# Patient Record
Sex: FEMALE | Race: WHITE | HISPANIC OR LATINO | Employment: FULL TIME | URBAN - METROPOLITAN AREA
[De-identification: names, ages, dates, MRNs, and addresses within clinical notes are randomized per-mention and may not be internally consistent; named-entity substitution may affect disease eponyms.]

---

## 2018-12-07 ENCOUNTER — OFFICE VISIT (OUTPATIENT)
Dept: URGENT CARE | Facility: CLINIC | Age: 28
End: 2018-12-07
Payer: MEDICAID

## 2018-12-07 VITALS
RESPIRATION RATE: 16 BRPM | BODY MASS INDEX: 19.49 KG/M2 | HEIGHT: 63 IN | TEMPERATURE: 99.2 F | HEART RATE: 78 BPM | OXYGEN SATURATION: 100 % | WEIGHT: 110 LBS | SYSTOLIC BLOOD PRESSURE: 110 MMHG | DIASTOLIC BLOOD PRESSURE: 68 MMHG

## 2018-12-07 DIAGNOSIS — J10.1 INFLUENZA A: ICD-10-CM

## 2018-12-07 LAB
FLUAV+FLUBV AG SPEC QL IA: NORMAL
INT CON NEG: NEGATIVE
INT CON NEG: NORMAL
INT CON POS: NORMAL
INT CON POS: POSITIVE
S PYO AG THROAT QL: NORMAL

## 2018-12-07 PROCEDURE — 87880 STREP A ASSAY W/OPTIC: CPT | Performed by: PHYSICIAN ASSISTANT

## 2018-12-07 PROCEDURE — 99204 OFFICE O/P NEW MOD 45 MIN: CPT | Performed by: PHYSICIAN ASSISTANT

## 2018-12-07 PROCEDURE — 87804 INFLUENZA ASSAY W/OPTIC: CPT | Performed by: PHYSICIAN ASSISTANT

## 2018-12-07 RX ORDER — ACETAMINOPHEN 120 MG/1
120 SUPPOSITORY RECTAL EVERY 4 HOURS PRN
COMMUNITY
End: 2018-12-10

## 2018-12-07 RX ORDER — AMOXICILLIN 125 MG/1
125 TABLET, CHEWABLE ORAL 3 TIMES DAILY
COMMUNITY
End: 2018-12-10

## 2018-12-07 RX ORDER — OSELTAMIVIR PHOSPHATE 75 MG/1
75 CAPSULE ORAL 2 TIMES DAILY
Qty: 10 CAP | Refills: 0 | Status: SHIPPED | OUTPATIENT
Start: 2018-12-07 | End: 2018-12-12

## 2018-12-07 RX ORDER — OMEGA-3 FATTY ACIDS/FISH OIL 300-1000MG
CAPSULE ORAL
COMMUNITY
End: 2018-12-10

## 2018-12-07 ASSESSMENT — ENCOUNTER SYMPTOMS
SENSORY CHANGE: 0
ABDOMINAL PAIN: 0
DIARRHEA: 0
PALPITATIONS: 0
NAUSEA: 0
FEVER: 1
SHORTNESS OF BREATH: 0
SINUS PAIN: 0
HEADACHES: 1
MYALGIAS: 1
TINGLING: 0
HEMOPTYSIS: 0
VOMITING: 0
WHEEZING: 0
COUGH: 1
RHINORRHEA: 1
SORE THROAT: 1
SWEATS: 0
SPUTUM PRODUCTION: 1
FOCAL WEAKNESS: 0
CHILLS: 1

## 2018-12-07 ASSESSMENT — COPD QUESTIONNAIRES: COPD: 0

## 2018-12-07 NOTE — PROGRESS NOTES
"Subjective:      Muriel Villaseñor is a 28 y.o. female who presents with Cough (x 2 days coughing mucus, sore throat, fever, weakness)            Cough   This is a new problem. Episode onset: 2 days. The problem has been unchanged. The cough is productive of sputum (yellow ). Associated symptoms include chills, a fever (subjective ), headaches, myalgias, nasal congestion, rhinorrhea and a sore throat. Pertinent negatives include no chest pain, ear congestion, ear pain, hemoptysis, postnasal drip, rash, shortness of breath, sweats or wheezing. Nothing aggravates the symptoms. She has tried nothing for the symptoms. There is no history of asthma, bronchitis, COPD or pneumonia.       No past medical history on file.    No past surgical history on file.    No family history on file.    No Known Allergies    Medications, Allergies, and current problem list reviewed today in Epic    Review of Systems   Constitutional: Positive for chills, fever (subjective ) and malaise/fatigue.   HENT: Positive for congestion, rhinorrhea and sore throat. Negative for ear discharge, ear pain, postnasal drip and sinus pain.    Respiratory: Positive for cough and sputum production. Negative for hemoptysis, shortness of breath and wheezing.    Cardiovascular: Negative for chest pain, palpitations and leg swelling.   Gastrointestinal: Negative for abdominal pain, diarrhea, nausea and vomiting.   Musculoskeletal: Positive for myalgias.   Skin: Negative for rash.   Neurological: Positive for headaches. Negative for tingling, sensory change and focal weakness.     All other systems reviewed and are negative.          Objective:     /68   Pulse 78   Temp 37.3 °C (99.2 °F) (Temporal)   Resp 16   Ht 1.6 m (5' 3\")   Wt 49.9 kg (110 lb)   SpO2 100%   BMI 19.49 kg/m²      Physical Exam   Constitutional: She is oriented to person, place, and time. She appears well-developed and well-nourished. No distress.   HENT:   Head: Normocephalic and " atraumatic.   Right Ear: Tympanic membrane, external ear and ear canal normal.   Left Ear: Tympanic membrane, external ear and ear canal normal.   Nose: Mucosal edema and rhinorrhea (clear) present.   Mouth/Throat: Uvula is midline and mucous membranes are normal. Posterior oropharyngeal erythema present. No tonsillar exudate.   Eyes: Conjunctivae are normal.   Neck: Neck supple.   Cardiovascular: Normal rate, regular rhythm and normal heart sounds.  Exam reveals no gallop and no friction rub.    No murmur heard.  Pulmonary/Chest: Effort normal and breath sounds normal. No respiratory distress. She has no decreased breath sounds. She has no wheezes. She has no rhonchi. She has no rales.   Lymphadenopathy:     She has cervical adenopathy (mild anterior cervical adenopathy. ).   Neurological: She is alert and oriented to person, place, and time. No cranial nerve deficit.   Skin: Skin is warm and dry. No rash noted.   Psychiatric: She has a normal mood and affect. Her behavior is normal. Judgment and thought content normal.               Assessment/Plan:     1. Influenza A  POCT Influenza A/B    POCT Rapid Strep A    oseltamivir (TAMIFLU) 75 MG Cap       - POCT Influenza A/B- Positive A  - POCT Rapid Strep A- negative.    •  oseltamivir (TAMIFLU) 75 MG Cap, Take 1 Cap by mouth 2 times a day for 5 days., Disp: 10 Cap, Rfl: 0    - encouraged fluids, rest, humidification, OTC decongestants and cough suppressants.   RTC if any worsening symptoms.   AVS printed and given to patient with home care instructions and indications for immediate follow-up.       Differential diagnoses, Supportive care, and indications for immediate follow-up discussed with patient.   Instructed to return to clinic or nearest emergency department for any change in condition, further concerns, or worsening of symptoms.    The patient demonstrated a good understanding and agreed with the treatment plan.    Saige Gonzales P.A.-C.

## 2018-12-07 NOTE — PATIENT INSTRUCTIONS
Gripe en los adultos  (Influenza, Adult)  La gripe es elliot infección viral que afecta principalmente las vías respiratorias. Las vías respiratorias incluyen órganos que ayudan a respirar, aranza los pulmones, la nariz y la garganta. La gripe provoca muchos síntomas del resfrío común, así aranza fiebre roxy y dolor corporal.  Se transmite fácilmente de persona a persona (es contagiosa). La mejor manera de prevenir la gripe es aplicándose la vacuna contra la gripe todos los años.  CAUSAS  La gripe es causada por un virus. Se puede contagiar el virus de las siguientes maneras:  · Al aspirar las gotitas que elliot persona infectada elimina al toser o estornudar.  · Al tocar algo que fue recientemente contaminado con el virus y luego llevarse la mano a la boca, la nariz o los ojos.  FACTORES DE RIESGO  Los siguientes factores pueden hacer que usted sea propenso a contraer la gripe:  · No lavarse las anthony frecuentemente con agua y jabón o un desinfectante de anthony a base de alcohol.  · Tener contacto cercano con muchas personas nakia la temporada de resfrío y gripe.  · Tocarse la boca, los ojos o la nariz sin lavarse ni desinfectarse las anthony jason.  · No beber suficientes líquidos o no seguir elliot dieta saludable.  · No dormir lo suficiente o no hacer suficiente actividad física.  · Tener un alto campos de estrés.  · No colocarse la vacuna anual contra la gripe.  Puede correr un mayor riesgo de complicaciones de la gripe, aranza elliot infección pulmonar grave (neumonía) si:  · Tiene más de 65 años.  · Está embarazada.  · Tiene un sistema que combate las defensas (sistema inmunitario) debilitado. Puede tener un sistema inmunitario debilitado si:  ¨ Tiene VIH o sida.  ¨ Está recibiendo quimioterapia.  ¨ Usa medicamentos que reducen la actividad (suprimen) del sistema inmunitario.  · Tiene elliot enfermedad a ila plazo (crónica), aranza cardiopatía coronaria, enfermedad renal, diabetes o enfermedad pulmonar.  · Tiene un trastorno  hepático.  · Son obesas.  · Tiene anemia.  SÍNTOMAS  Los síntomas de esta afección por lo general carey entre 4 y 10 días, y pueden tener los siguientes síntomas:  · Fiebre.  · Escalofríos.  · Dolor de valencia, ailin en el cuerpo o ailin musculares.  · Dolor de garganta.  · Tos.  · Secreción o congestión nasal.  · Molestias en el pecho y tos.  · Pérdida del apetito.  · Debilidad o cansancio (fatiga).  · Mareos.  · Náuseas o vómitos.  DIAGNÓSTICO  Esta afección se puede diagnosticar en función de los antecedentes médicos y un examen físico. El médico puede indicarle un cultivo faríngeo o nasal para confirmar el diagnóstico.  TRATAMIENTO  Si la gripe se detecta de manera temprana, puede recibir tratamiento con medicamentos antivirales que pueden reducir la duración de la enfermedad y la gravedad de los síntomas. [place] medicamento se puede administrar por vía oral o por vía intravenosa (IV), es decir, a través de un tubo que se coloca en elliot vena.  El objetivo del tratamiento es aliviar los síntomas cuidándose en sheppard casa. [place] puede incluir usar medicamentos de venta melonie, beber elliot gran cantidad de líquidos y agregar humedad al aire en sheppard casa.  En algunos casos, la gripe se evie sin tratamiento. Los casos graves o las complicaciones de gripe se pueden tratar en un hospital.  INSTRUCCIONES PARA EL CUIDADO EN EL HOGAR  · [place] los medicamentos de venta melonie y los recetados solamente aranza se lo haya indicado el médico.  · Use un humidificador de aire frío para agregar humedad al aire de sheppard casa. [place] puede ayudarlo a respirar mejor.  · Descanse todo lo que sea necesario.  · Griselda suficiente líquido para mantener la orina celine o de color amarillo pálido.  · Al toser o estornudar, cúbrase la boca y la nariz.  · Lávese las anthony con agua y jabón frecuentemente, en especial después de toser o estornudar. Use desinfectante para anthony si no dispone de agua y jabón.  · Quédese en sheppard casa y no concurra al trabajo o a la  escuela, aranza se lo haya indicado el médico. A menos que visite al médico, evite salir de sheppard casa hasta que no tenga fiebre nakia 24 horas sin el uso de medicamentos.  · Concurra a todas las visitas de control aranza se lo haya indicado el médico. [place] es importante.  PREVENCIÓN  · Aplicarse la vacuna anual contra la gripe es la mejor manera de evitar contagiarse la gripe. Puede aplicarse la vacuna contra la gripe a fines de verano, en otoño o en invierno. Pregúntele al médico cuándo debe aplicarse la vacuna contra la gripe.  · Lávese las anthony o use un desinfectante de anthony con frecuencia.  · Evite el contacto con personas que están enfermas nakia la temporada de resfrío y gripe.  · Siga elliot dieta saludable, asya muchos líquidos, duerma lo suficiente y realice actividad física con regularidad.  SOLICITE ATENCIÓN MÉDICA SI:  · Presenta nuevos síntomas.  · Tiene los siguientes síntomas:  ¨ Dolor en el pecho.  ¨ Diarrea.  ¨ Fiebre.  · La tos empeora.  · Produce más mucosidad.  · Siente náuseas o vomita.  SOLICITE ATENCIÓN MÉDICA DE INMEDIATO SI:  · Le falta el aire o tiene dificultad para respirar.  · La piel o las uñas se tornan de un color azulado.  · Presenta dolor intenso o rigidez de courtney.  · Le duele la valencia de forma repentina o tiene dolor en la linn o el oído.  · No puede dejar de vomitar.  Esta información no tiene aranza fin reemplazar el consejo del médico. Asegúrese de hacerle al médico cualquier pregunta que tenga.  Document Released: 09/27/2006 Document Revised: 04/10/2017 Document Reviewed: 10/11/2016  Elsevier Interactive Patient Education © 2017 [employer] Inc[employer]

## 2018-12-10 ENCOUNTER — OFFICE VISIT (OUTPATIENT)
Dept: URGENT CARE | Facility: CLINIC | Age: 28
End: 2018-12-10
Payer: MEDICAID

## 2018-12-10 VITALS
HEART RATE: 68 BPM | HEIGHT: 63 IN | SYSTOLIC BLOOD PRESSURE: 104 MMHG | TEMPERATURE: 98 F | WEIGHT: 110 LBS | DIASTOLIC BLOOD PRESSURE: 76 MMHG | RESPIRATION RATE: 12 BRPM | BODY MASS INDEX: 19.49 KG/M2 | OXYGEN SATURATION: 95 %

## 2018-12-10 DIAGNOSIS — R11.0 NAUSEA: ICD-10-CM

## 2018-12-10 DIAGNOSIS — J11.1 FLU: ICD-10-CM

## 2018-12-10 LAB
APPEARANCE UR: CLEAR
BILIRUB UR STRIP-MCNC: NORMAL MG/DL
COLOR UR AUTO: NORMAL
GLUCOSE UR STRIP.AUTO-MCNC: NORMAL MG/DL
INT CON NEG: NEGATIVE
INT CON POS: POSITIVE
KETONES UR STRIP.AUTO-MCNC: NORMAL MG/DL
LEUKOCYTE ESTERASE UR QL STRIP.AUTO: NORMAL
NITRITE UR QL STRIP.AUTO: NORMAL
PH UR STRIP.AUTO: 6 [PH] (ref 5–8)
POC URINE PREGNANCY TEST: NEGATIVE
PROT UR QL STRIP: NORMAL MG/DL
RBC UR QL AUTO: NORMAL
SP GR UR STRIP.AUTO: 1.02
UROBILINOGEN UR STRIP-MCNC: 0.2 MG/DL

## 2018-12-10 PROCEDURE — 81002 URINALYSIS NONAUTO W/O SCOPE: CPT | Performed by: FAMILY MEDICINE

## 2018-12-10 PROCEDURE — 81025 URINE PREGNANCY TEST: CPT | Performed by: FAMILY MEDICINE

## 2018-12-10 PROCEDURE — 99214 OFFICE O/P EST MOD 30 MIN: CPT | Performed by: FAMILY MEDICINE

## 2018-12-10 RX ORDER — PROMETHAZINE HYDROCHLORIDE, PHENYLEPHRINE HYDROCHLORIDE AND CODEINE PHOSPHATE 6.25; 5; 1 MG/5ML; MG/5ML; MG/5ML
5 SOLUTION ORAL EVERY 6 HOURS PRN
Qty: 200 ML | Refills: 0 | Status: SHIPPED | OUTPATIENT
Start: 2018-12-10 | End: 2018-12-20

## 2018-12-10 ASSESSMENT — ENCOUNTER SYMPTOMS
DIZZINESS: 0
MYALGIAS: 1
COUGH: 1
FEVER: 1
HEMOPTYSIS: 0
CHILLS: 0
ORTHOPNEA: 0
FOCAL WEAKNESS: 0
SHORTNESS OF BREATH: 0

## 2018-12-10 NOTE — PROGRESS NOTES
"Subjective:      Muriel Villaseñor is a 28 y.o. female who presents with Medication Reaction (Feels nausea when taking tamiflu and also feels weak )    - This is a very pleasant, well and non-toxic appearing 28 y.o. female with complaints of 5 days ago w/ cough, runny nose, aches/malaise/fevers. Came to clinic about 3 days ago and diagnosed w/ Flu and started on tamiflu. Feels improved but coughing a lot still and tamiflu makes her nauseated at times.           ALLERGIES:  Patient has no known allergies.     PMH:  History reviewed. No pertinent past medical history.     MEDS:    Current Outpatient Prescriptions:   •  Promethazine-Phenyleph-Codeine (PHENERGAN VC CODEINE) 6.25-5-10 MG/5ML Syrup, Take 5 mL by mouth every 6 hours as needed for up to 10 days., Disp: 200 mL, Rfl: 0  •  oseltamivir (TAMIFLU) 75 MG Cap, Take 1 Cap by mouth 2 times a day for 5 days., Disp: 10 Cap, Rfl: 0    ** I have documented what I find to be significant in regards to past medical, social, family and surgical history  in my HPI or under PMH/PSH/FH review section, otherwise it is contributory **             HPI    Review of Systems   Constitutional: Positive for fever and malaise/fatigue. Negative for chills.   HENT: Positive for congestion.    Respiratory: Positive for cough. Negative for hemoptysis and shortness of breath.    Cardiovascular: Negative for chest pain and orthopnea.   Musculoskeletal: Positive for myalgias.   Neurological: Negative for dizziness and focal weakness.          Objective:     /76   Pulse 68   Temp 36.7 °C (98 °F)   Resp 12   Ht 1.6 m (5' 3\")   Wt 49.9 kg (110 lb)   SpO2 95%   BMI 19.49 kg/m²      Physical Exam   Constitutional: She appears well-developed. No distress.   HENT:   Head: Normocephalic and atraumatic.   Mouth/Throat: Oropharynx is clear and moist.   Eyes: Conjunctivae are normal.   Neck: Neck supple.   Cardiovascular: Regular rhythm.    No murmur heard.  Pulmonary/Chest: Effort normal and " breath sounds normal. No respiratory distress.   Neurological: She is alert. She exhibits normal muscle tone.   Skin: Skin is warm and dry.   Psychiatric: She has a normal mood and affect. Judgment normal.   Nursing note and vitals reviewed.              Assessment/Plan:         1. Nausea  POCT Pregnancy    POCT Urinalysis   2. Flu  Promethazine-Phenyleph-Codeine (PHENERGAN VC CODEINE) 6.25-5-10 MG/5ML Syrup             Dx & d/c instructions discussed w/ patient and/or family members.     ER precautions (worsening signs symptoms and when to go to ER) discussed.    Follow up w/ PCP in 2-3 days to make sure symptoms improving and no further intervention/treatment and/or work-up needed was advised, ER if feeling worse or not improving in 2 days.    Possible side effects (i.e. Rash, GI upset/constipation, sedation, elevation of BP or sugars) of any medications given discussed.     Patient left in stable condition

## 2018-12-10 NOTE — LETTER
December 10, 2018         Patient: Muriel Villaseñor   YOB: 1990   Date of Visit: 12/10/2018           To Whom it May Concern:    Muriel Villaseñor was seen in my clinic on 12/10/2018. She may return to work in 2-3 days.    If you have any questions or concerns, please don't hesitate to call.        Sincerely,           Pasquale Hogue M.D.  Electronically Signed

## 2020-01-04 ENCOUNTER — OFFICE VISIT (OUTPATIENT)
Dept: URGENT CARE | Facility: CLINIC | Age: 30
End: 2020-01-04

## 2020-01-04 VITALS
HEART RATE: 75 BPM | WEIGHT: 111.8 LBS | DIASTOLIC BLOOD PRESSURE: 76 MMHG | SYSTOLIC BLOOD PRESSURE: 120 MMHG | TEMPERATURE: 98.1 F | HEIGHT: 65 IN | OXYGEN SATURATION: 99 % | RESPIRATION RATE: 14 BRPM | BODY MASS INDEX: 18.63 KG/M2

## 2020-01-04 DIAGNOSIS — N30.01 ACUTE CYSTITIS WITH HEMATURIA: ICD-10-CM

## 2020-01-04 LAB
APPEARANCE UR: NORMAL
BILIRUB UR STRIP-MCNC: NEGATIVE MG/DL
COLOR UR AUTO: YELLOW
GLUCOSE UR STRIP.AUTO-MCNC: NEGATIVE MG/DL
KETONES UR STRIP.AUTO-MCNC: 80 MG/DL
LEUKOCYTE ESTERASE UR QL STRIP.AUTO: NORMAL
NITRITE UR QL STRIP.AUTO: POSITIVE
PH UR STRIP.AUTO: 5.5 [PH] (ref 5–8)
PROT UR QL STRIP: NORMAL MG/DL
RBC UR QL AUTO: NORMAL
SP GR UR STRIP.AUTO: 1.02
UROBILINOGEN UR STRIP-MCNC: 0.2 MG/DL

## 2020-01-04 PROCEDURE — 99214 OFFICE O/P EST MOD 30 MIN: CPT | Performed by: FAMILY MEDICINE

## 2020-01-04 PROCEDURE — 81002 URINALYSIS NONAUTO W/O SCOPE: CPT | Performed by: FAMILY MEDICINE

## 2020-01-04 RX ORDER — NITROFURANTOIN 25; 75 MG/1; MG/1
100 CAPSULE ORAL EVERY 12 HOURS
Qty: 10 CAP | Refills: 0 | Status: SHIPPED | OUTPATIENT
Start: 2020-01-04 | End: 2020-01-09

## 2020-01-04 ASSESSMENT — ENCOUNTER SYMPTOMS
WEIGHT LOSS: 0
EYE DISCHARGE: 0
NAUSEA: 0
VOMITING: 0
MYALGIAS: 0
EYE REDNESS: 0

## 2020-01-04 NOTE — PROGRESS NOTES
"Subjective:      Muriel Villaseñor is a 29 y.o. female who presents with UTI (x 1 day, pain in lower left back, burning sensation when urinating)            Per boyfriend translating. 5d urinary burning, urgency, frequency. Initial hematuria resolved. LBP. No flank pain. No fever. No PMH pyelonephritis. Short term relief with Azo. LMP 1wk ago. Denies possible pregnancy. No other aggravating or alleviating factors.        Review of Systems   Constitutional: Negative for malaise/fatigue and weight loss.   Eyes: Negative for discharge and redness.   Gastrointestinal: Negative for nausea and vomiting.   Musculoskeletal: Negative for joint pain and myalgias.   Skin: Negative for itching and rash.     .  Medications, Allergies, and current problem list reviewed today in Epic       Objective:     /76 (Patient Position: Sitting)   Pulse 75   Temp 36.7 °C (98.1 °F) (Temporal)   Resp 14   Ht 1.651 m (5' 5\")   Wt 50.7 kg (111 lb 12.8 oz)   SpO2 99%   BMI 18.60 kg/m²      Physical Exam  Constitutional:       General: She is not in acute distress.     Appearance: She is well-developed.   HENT:      Head: Normocephalic and atraumatic.   Eyes:      Conjunctiva/sclera: Conjunctivae normal.   Cardiovascular:      Rate and Rhythm: Normal rate and regular rhythm.      Heart sounds: Normal heart sounds. No murmur.   Pulmonary:      Effort: Pulmonary effort is normal.      Breath sounds: Normal breath sounds. No wheezing.   Genitourinary:     Comments: No CVAT. +suprapubic tenderness  Skin:     General: Skin is warm and dry.      Findings: No rash.   Neurological:      Mental Status: She is alert and oriented to person, place, and time.                 Assessment/Plan:     UA reviewed    1. Acute cystitis with hematuria  POCT Urinalysis    nitrofurantoin monohyd macro (MACROBID) 100 MG Cap     Differential diagnosis, natural history, supportive care, and indications for immediate follow-up discussed at length.     Push fluids    "

## 2022-03-19 ENCOUNTER — OFFICE VISIT (OUTPATIENT)
Dept: URGENT CARE | Facility: CLINIC | Age: 32
End: 2022-03-19
Payer: COMMERCIAL

## 2022-03-19 ENCOUNTER — HOSPITAL ENCOUNTER (OUTPATIENT)
Facility: MEDICAL CENTER | Age: 32
End: 2022-03-19
Attending: PHYSICIAN ASSISTANT
Payer: COMMERCIAL

## 2022-03-19 VITALS
TEMPERATURE: 98.6 F | RESPIRATION RATE: 20 BRPM | HEIGHT: 62 IN | BODY MASS INDEX: 22.82 KG/M2 | OXYGEN SATURATION: 97 % | WEIGHT: 124 LBS | SYSTOLIC BLOOD PRESSURE: 120 MMHG | DIASTOLIC BLOOD PRESSURE: 68 MMHG | HEART RATE: 87 BPM

## 2022-03-19 DIAGNOSIS — Z3A.28 28 WEEKS GESTATION OF PREGNANCY: ICD-10-CM

## 2022-03-19 DIAGNOSIS — B34.9 NONSPECIFIC SYNDROME SUGGESTIVE OF VIRAL ILLNESS: ICD-10-CM

## 2022-03-19 LAB
INT CON NEG: NORMAL
INT CON POS: NORMAL
S PYO AG THROAT QL: NEGATIVE

## 2022-03-19 PROCEDURE — 87880 STREP A ASSAY W/OPTIC: CPT | Performed by: PHYSICIAN ASSISTANT

## 2022-03-19 PROCEDURE — U0003 INFECTIOUS AGENT DETECTION BY NUCLEIC ACID (DNA OR RNA); SEVERE ACUTE RESPIRATORY SYNDROME CORONAVIRUS 2 (SARS-COV-2) (CORONAVIRUS DISEASE [COVID-19]), AMPLIFIED PROBE TECHNIQUE, MAKING USE OF HIGH THROUGHPUT TECHNOLOGIES AS DESCRIBED BY CMS-2020-01-R: HCPCS

## 2022-03-19 PROCEDURE — 99214 OFFICE O/P EST MOD 30 MIN: CPT | Performed by: PHYSICIAN ASSISTANT

## 2022-03-19 PROCEDURE — U0005 INFEC AGEN DETEC AMPLI PROBE: HCPCS

## 2022-03-19 ASSESSMENT — ENCOUNTER SYMPTOMS
SHORTNESS OF BREATH: 0
ABDOMINAL PAIN: 0
MYALGIAS: 0
NAUSEA: 0
DIARRHEA: 0
EYE PAIN: 0
SORE THROAT: 1
FEVER: 0
CHILLS: 0
VOMITING: 0
CONSTIPATION: 0
COUGH: 0
HEADACHES: 1

## 2022-03-19 NOTE — LETTER
March 19, 2022         Patient: Muriel Villaseñor   YOB: 1990   Date of Visit: 3/19/2022           To Whom it May Concern:    Muriel Villaseñor was seen in my clinic on 3/19/2022.    If you have any questions or concerns, please don't hesitate to call.        Sincerely,           Frank Dan P.A.-C.  Electronically Signed

## 2022-03-20 DIAGNOSIS — B34.9 NONSPECIFIC SYNDROME SUGGESTIVE OF VIRAL ILLNESS: ICD-10-CM

## 2022-03-20 LAB
COVID ORDER STATUS COVID19: NORMAL
SARS-COV-2 RNA RESP QL NAA+PROBE: NOTDETECTED
SPECIMEN SOURCE: NORMAL

## 2022-03-20 NOTE — PROGRESS NOTES
"Subjective:   Muriel Villaseñor is a 31 y.o. female who presents for Pharyngitis (Sore throat, HA, nasal sita, SOB, not sleeping x 3 days)      31-year-old female presents with a 3-day history of mild sore throat, headache, nasal congestion as well as exercise fatigue.  She had difficulty sleeping secondary to nasal congestion.  She is around 5 months pregnant, talked to her OB/GYN who recommended use of Tylenol and Claritin.  No known sick contacts, no recent travel.  Currently no difficulty breathing.  No fevers or chills or body aches      Review of Systems   Constitutional: Positive for malaise/fatigue. Negative for chills and fever.   HENT: Positive for congestion and sore throat. Negative for ear pain.    Eyes: Negative for pain.   Respiratory: Negative for cough and shortness of breath.    Cardiovascular: Negative for chest pain.   Gastrointestinal: Negative for abdominal pain, constipation, diarrhea, nausea and vomiting.   Genitourinary: Negative for dysuria.   Musculoskeletal: Negative for myalgias.   Skin: Negative for rash.   Neurological: Positive for headaches.       Medications, Allergies, and current problem list reviewed today in Epic.     Objective:     /68 (BP Location: Left arm, Patient Position: Sitting, BP Cuff Size: Adult long)   Pulse 87   Temp 37 °C (98.6 °F) (Temporal)   Resp 20   Ht 1.58 m (5' 2.21\")   Wt 56.2 kg (124 lb)   SpO2 97%     Physical Exam  Vitals reviewed.   Constitutional:       Appearance: Normal appearance.   HENT:      Head: Normocephalic and atraumatic.      Right Ear: Tympanic membrane, ear canal and external ear normal.      Left Ear: Tympanic membrane, ear canal and external ear normal.      Nose: Congestion and rhinorrhea present.      Mouth/Throat:      Mouth: Mucous membranes are moist.      Pharynx: No oropharyngeal exudate or posterior oropharyngeal erythema.      Comments: POST NASAL DRIP  Eyes:      Conjunctiva/sclera: Conjunctivae normal.   Cardiovascular: "      Rate and Rhythm: Normal rate and regular rhythm.   Pulmonary:      Effort: Pulmonary effort is normal.      Breath sounds: Normal breath sounds.   Musculoskeletal:      Cervical back: Normal range of motion.   Lymphadenopathy:      Cervical: No cervical adenopathy.   Skin:     General: Skin is warm and dry.      Capillary Refill: Capillary refill takes less than 2 seconds.   Neurological:      Mental Status: She is alert and oriented to person, place, and time.         Assessment/Plan:     Diagnosis and associated orders:     1. Nonspecific syndrome suggestive of viral illness  POCT Rapid Strep A    COVID/SARS CoV-2 PCR   2. 28 weeks gestation of pregnancy        Comments/MDM:     • Strep negative  • Short duration of symptoms without any severe symptoms or signs secondary bacterial consolidation.  Recommend Covid testing and strict ER precautions.  Patient to get the results via hi5hart.  She is not vaccinated, pregnant and high risk.  I discussed with her the severity of her situation if she is Covid positive and to closely monitor symptoms and have low threshold to present to the ER if she has any worsening dyspnea or chest pain.  Currently she is well-appearing without any respiratory distress, well-appearing vital signs and normal auscultatory exam         Differential diagnosis, natural history, supportive care, and indications for immediate follow-up discussed.    Advised the patient to follow-up with the primary care physician for recheck, reevaluation, and consideration of further management.    Please note that this dictation was created using voice recognition software. I have made a reasonable attempt to correct obvious errors, but I expect that there are errors of grammar and possibly content that I did not discover before finalizing the note.    This note was electronically signed by Frank Dan PA-C

## 2022-05-28 ENCOUNTER — HOSPITAL ENCOUNTER (EMERGENCY)
Facility: MEDICAL CENTER | Age: 32
End: 2022-05-28
Payer: COMMERCIAL

## 2022-05-28 ENCOUNTER — HOSPITAL ENCOUNTER (EMERGENCY)
Facility: MEDICAL CENTER | Age: 32
End: 2022-05-28
Attending: OBSTETRICS & GYNECOLOGY | Admitting: OBSTETRICS & GYNECOLOGY
Payer: COMMERCIAL

## 2022-05-28 VITALS
TEMPERATURE: 97.5 F | SYSTOLIC BLOOD PRESSURE: 110 MMHG | HEIGHT: 62 IN | HEART RATE: 72 BPM | DIASTOLIC BLOOD PRESSURE: 70 MMHG | WEIGHT: 135 LBS | OXYGEN SATURATION: 94 % | BODY MASS INDEX: 24.84 KG/M2

## 2022-05-28 LAB
ALBUMIN SERPL BCP-MCNC: 3.3 G/DL (ref 3.2–4.9)
ALBUMIN/GLOB SERPL: 1.1 G/DL
ALP SERPL-CCNC: 145 U/L (ref 30–99)
ALT SERPL-CCNC: 14 U/L (ref 2–50)
ANION GAP SERPL CALC-SCNC: 13 MMOL/L (ref 7–16)
AST SERPL-CCNC: 22 U/L (ref 12–45)
BILIRUB SERPL-MCNC: 0.2 MG/DL (ref 0.1–1.5)
BUN SERPL-MCNC: 7 MG/DL (ref 8–22)
CALCIUM SERPL-MCNC: 8.7 MG/DL (ref 8.5–10.5)
CHLORIDE SERPL-SCNC: 104 MMOL/L (ref 96–112)
CO2 SERPL-SCNC: 19 MMOL/L (ref 20–33)
CREAT SERPL-MCNC: 0.35 MG/DL (ref 0.5–1.4)
EKG IMPRESSION: NORMAL
GFR SERPLBLD CREATININE-BSD FMLA CKD-EPI: 139 ML/MIN/1.73 M 2
GLOBULIN SER CALC-MCNC: 3.1 G/DL (ref 1.9–3.5)
GLUCOSE SERPL-MCNC: 106 MG/DL (ref 65–99)
POTASSIUM SERPL-SCNC: 3.4 MMOL/L (ref 3.6–5.5)
PROT SERPL-MCNC: 6.4 G/DL (ref 6–8.2)
SODIUM SERPL-SCNC: 136 MMOL/L (ref 135–145)
TSH SERPL DL<=0.005 MIU/L-ACNC: 2.22 UIU/ML (ref 0.38–5.33)

## 2022-05-28 PROCEDURE — 80053 COMPREHEN METABOLIC PANEL: CPT

## 2022-05-28 PROCEDURE — 93010 ELECTROCARDIOGRAM REPORT: CPT | Performed by: INTERNAL MEDICINE

## 2022-05-28 PROCEDURE — 59025 FETAL NON-STRESS TEST: CPT

## 2022-05-28 PROCEDURE — 84443 ASSAY THYROID STIM HORMONE: CPT

## 2022-05-28 PROCEDURE — 700102 HCHG RX REV CODE 250 W/ 637 OVERRIDE(OP): Performed by: OBSTETRICS & GYNECOLOGY

## 2022-05-28 PROCEDURE — 99284 EMERGENCY DEPT VISIT MOD MDM: CPT

## 2022-05-28 PROCEDURE — 36415 COLL VENOUS BLD VENIPUNCTURE: CPT

## 2022-05-28 PROCEDURE — 93005 ELECTROCARDIOGRAM TRACING: CPT | Performed by: OBSTETRICS & GYNECOLOGY

## 2022-05-28 PROCEDURE — A9270 NON-COVERED ITEM OR SERVICE: HCPCS | Performed by: OBSTETRICS & GYNECOLOGY

## 2022-05-28 RX ORDER — POTASSIUM CHLORIDE 20 MEQ/1
20 TABLET, EXTENDED RELEASE ORAL DAILY
Status: DISCONTINUED | OUTPATIENT
Start: 2022-05-28 | End: 2022-05-28 | Stop reason: HOSPADM

## 2022-05-28 RX ADMIN — POTASSIUM CHLORIDE 20 MEQ: 20 TABLET, EXTENDED RELEASE ORAL at 14:43

## 2022-05-28 ASSESSMENT — PAIN SCALES - GENERAL: PAINLEVEL: 8

## 2022-05-28 NOTE — PROGRESS NOTES
Report received from Janna LITTLEJOHN.     1430-Dr. Francisco at bedside to see pt. Patient has not felt any pressure in her chest since being here and feels normal FM now. Pt may d/c home after receiving PO potassium. See MAR. D/c instructions reviewed with pt. Pt d/c'd in stable condition.

## 2022-05-28 NOTE — PROGRESS NOTES
LAKISHA: 2022 31.5    1016: Pt presents to L&D with c/o decreased fetal movement and pressure in chest. Pt states that she has not felt the baby move since 7 pm last night and has been feeling pressure in chest intermittently over the last two days. Denies LOF, VB, or UCs. External monitors applied. Reactive NST obtained.     1135: Dr. Francisco at bedside. Pt assessed by provider. Strip reviewed by MD. MD ordering EKG, CMP, TSH and GI cocktail.     1315: Report given to Doretha BOOKER RN. POC discussed.

## 2022-05-28 NOTE — ED PROVIDER NOTES
DATE OF SERVICE:  2022     HISTORY OF PRESENT ILLNESS:  The patient is a 31-year-old female  1,   para 0 who is 31 and 5/7th weeks' gestation, presents to labor and delivery   for several complaints.  The patient reports that since last night, she had   decreased fetal movement.  She reports it is better now that she is here in   labor and delivery, but this morning, she had not felt the baby move very   well.  She denies any leaking fluid or vaginal bleeding.  She also notes that   she is getting occasional episodes of some chest pressure and when she has the   pressure, it increases to her neck and her face and sometimes will cause a   headache and it will last a few seconds and go away.  She says they are   episodic and at some times when it is happening, she will have occasional pain   with it.  She does not necessarily describe chest pain, but just pressure.    She has no medical problems and she has had no problems this pregnancy thus   far.  When at the bedside with her, she has no nausea, vomiting, no diarrhea.    Normal bowel movements.  She is afebrile.  At the current time of seeing her,   she does not feel any pain.  Also, she denies any shortness of breath.  She   does not feel contractions and she denies any acid reflux.     PAST MEDICAL HISTORY:  She has no medical problems.     PAST SURGICAL HISTORY:  Negative.     OBSTETRICAL HISTORY:  .     ALLERGIES:  None.     CURRENT MEDICATIONS:  Include only prenatal vitamins.     SOCIAL HISTORY:  No alcohol, tobacco or history of drug use.     GYNECOLOGIC HISTORY:  Noncontributory.     PHYSICAL EXAMINATION:  VITAL SIGNS:  Stable.  Blood pressure is 110/70, her pulse is in the 70s-80s   and she is satting 96% on room air and is afebrile.  Weight 135 pounds.  GENERAL:  She is awake, alert, oriented.  She is in no acute distress.  CARDIOVASCULAR:  Regular rate and rhythm.  CHEST:  Clear to auscultation bilaterally.  ABDOMEN:  Gravid, nontender,  nondistended, normal bowel sounds.  EXTREMITIES:  No cyanosis, clubbing or edema.  PELVIC:  Sterile vaginal exam was deferred.  Fetal heart rate tracing category   1, reactive, moderate variability.  No decelerations.  She had some rare   contractions seen over the course of the time she was in labor and delivery,   but is not feeling them and her pressure is not associated with her   contractions.     DIAGNOSTIC DATA:  She had an EKG which was normal sinus rhythm.     LABORATORY DATA:  She had a chemistry panel, which did reveal a slightly low   potassium of 3.4, creatinine 0.35.  Liver function was normal.  Her TSH was   2.2.     ASSESSMENT AND PLAN:  A 31-year-old female,  at 31 and 5/7th weeks'   gestation.  1.  Decreased fetal movement, resolved.  The patient is feeling the baby move   well now, category 1 fetal tracing.  Continue kick counts b.i.d.  2.  Episodic pressure feeling up in her chest and up towards her face, was not   present at the time of me seeing her.  Pushing on her breast bone did not   reproduce any symptoms.  Her EKG was normal sinus rhythm.  Her labs were   essentially normal, although her potassium was slightly low.  She did receive   some potassium supplement prior to being discharged home.  Advised to eat a   regular diet, but we will plan on sending her home.  I also advised that she   may be having silent acid reflux that may be causing this or she may be having   these symptoms when she does contract even though she is not feeling   contraction pain.  I advised her to try to be aware of that and see if her   abdomen is hard at the time that she has these episodes.  She may take Pepcid   to see if possibility of acid reflux makes it go away.  Otherwise, she will   follow up with Dr. Sloan later this week, sooner if needed and continue kick   counts and  labor warnings.        ______________________________  MD EMILIE KAUR/BARBER    DD:  2022 16:35  DT:   05/28/2022 16:52    Job#:  [id number]

## 2022-05-28 NOTE — DISCHARGE INSTRUCTIONS
General Instructions:  If you think you are in labor, time contractions (lying on your left side) from the beginning of one contraction to the beginning of the next contraction for at least one hour.  Increase fluid intake: you should consume 10-12 8 oz glasses of non-caffeinated fluid per day.  Report any pressure or burning on urination to your physician.  Monitor fetal movement: If you notice an absence or decrease in fetal movement, drink a large glass of water and rest on your side.  If there is no increase in movement, call your physician or go to the hospital for further evaluation.  Report any sudden, sharp abdominal pain.  Report any bleeding.  Spotting or pinkish discharge is normal after vaginal exam.  You may also spot after sexual intercourse.    Pre-term Labor (<37 weeks):  Call your physician or return to the hospital if:  You have painless regular contractions more than 4 in one hour.  Your water breaks (remember time and color).  You have menstrual-like cramps, a low dull backache or pressure in your pelvis or back.  Your baby does not move enough to complete the daily kick count (10 movements in 2 hours).  Your baby moves much less often than on the days before or you have not felt your baby move all day.  Please review the MEDICATION LIST section of your AFTER VISIT SUMMARY document.  Take your medication as prescribed    Other Instructions:  Please carefully review your entire AFTER VISIT SUMMARY document for all discharge instructions.

## 2022-07-04 ENCOUNTER — OFFICE VISIT (OUTPATIENT)
Dept: URGENT CARE | Facility: CLINIC | Age: 32
End: 2022-07-04
Payer: COMMERCIAL

## 2022-07-04 ENCOUNTER — HOSPITAL ENCOUNTER (OUTPATIENT)
Facility: MEDICAL CENTER | Age: 32
End: 2022-07-04
Payer: COMMERCIAL

## 2022-07-04 VITALS
HEART RATE: 87 BPM | SYSTOLIC BLOOD PRESSURE: 100 MMHG | DIASTOLIC BLOOD PRESSURE: 60 MMHG | TEMPERATURE: 98.7 F | RESPIRATION RATE: 16 BRPM | BODY MASS INDEX: 23.66 KG/M2 | HEIGHT: 65 IN | WEIGHT: 142 LBS | OXYGEN SATURATION: 98 %

## 2022-07-04 DIAGNOSIS — R30.0 DYSURIA: ICD-10-CM

## 2022-07-04 DIAGNOSIS — N30.01 ACUTE CYSTITIS WITH HEMATURIA: ICD-10-CM

## 2022-07-04 DIAGNOSIS — R39.15 URINARY URGENCY: ICD-10-CM

## 2022-07-04 LAB
APPEARANCE UR: CLEAR
BILIRUB UR STRIP-MCNC: NEGATIVE MG/DL
COLOR UR AUTO: YELLOW
GLUCOSE UR STRIP.AUTO-MCNC: NEGATIVE MG/DL
KETONES UR STRIP.AUTO-MCNC: NEGATIVE MG/DL
LEUKOCYTE ESTERASE UR QL STRIP.AUTO: NORMAL
NITRITE UR QL STRIP.AUTO: NEGATIVE
PH UR STRIP.AUTO: 6 [PH] (ref 5–8)
PROT UR QL STRIP: NEGATIVE MG/DL
RBC UR QL AUTO: NORMAL
SP GR UR STRIP.AUTO: 1.02
UROBILINOGEN UR STRIP-MCNC: 1 MG/DL

## 2022-07-04 PROCEDURE — 81002 URINALYSIS NONAUTO W/O SCOPE: CPT

## 2022-07-04 PROCEDURE — 87186 SC STD MICRODIL/AGAR DIL: CPT | Mod: 91

## 2022-07-04 PROCEDURE — 99214 OFFICE O/P EST MOD 30 MIN: CPT | Mod: 25

## 2022-07-04 PROCEDURE — 87077 CULTURE AEROBIC IDENTIFY: CPT

## 2022-07-04 PROCEDURE — 87086 URINE CULTURE/COLONY COUNT: CPT

## 2022-07-04 RX ORDER — CEPHALEXIN 500 MG/1
500 CAPSULE ORAL 2 TIMES DAILY
Qty: 14 CAPSULE | Refills: 0 | Status: SHIPPED | OUTPATIENT
Start: 2022-07-04 | End: 2022-07-11

## 2022-07-04 ASSESSMENT — ENCOUNTER SYMPTOMS
CARDIOVASCULAR NEGATIVE: 1
MUSCULOSKELETAL NEGATIVE: 1
FLANK PAIN: 0
ABDOMINAL PAIN: 0
CHILLS: 0
RESPIRATORY NEGATIVE: 1
NAUSEA: 0
FEVER: 0
VOMITING: 0

## 2022-07-04 NOTE — PROGRESS NOTES
"Trini Villaseñor is a 32 y.o. female who presents with Dysuria            HPI     Patient presents with symptoms that started 5 days prior.  She endorses dysuria and urinary urgency.  She denies any urinary frequency, hematuria, flank pain.  She further denies any fever, chills, nausea, vomiting.  Patient reports some abdominal discomfort accompanying her symptoms.  She denies any vaginal bleeding bleeding or severe abdominal pain.  Patient is on her trip third trimester, reports that she spoke with her OB GYN and was instructed to come to urgent care for further evaluation and management.      Patient's current problem list, medications, and past medical/surgical history were reviewed in Epic.    PMH:  has no past medical history on file.  MEDS: No current outpatient medications on file.  ALLERGIES: No Known Allergies  SURGHX: History reviewed. No pertinent surgical history.  SOCHX:  reports that she has never smoked. She has never used smokeless tobacco. She reports that she does not drink alcohol and does not use drugs.  FH: Reviewed with patient, not pertinent to this visit.       Review of Systems   Constitutional: Negative for chills and fever.   HENT: Negative.    Respiratory: Negative.    Cardiovascular: Negative.    Gastrointestinal: Negative for abdominal pain, nausea and vomiting.   Genitourinary: Positive for dysuria and urgency. Negative for flank pain, frequency and hematuria.   Musculoskeletal: Negative.               Objective     /60 (BP Location: Right arm, Patient Position: Sitting, BP Cuff Size: Adult long)   Pulse 87   Temp 37.1 °C (98.7 °F) (Temporal)   Resp 16   Ht 1.651 m (5' 5\")   Wt 64.4 kg (142 lb)   SpO2 98%   BMI 23.63 kg/m²      Physical Exam  Constitutional:       Appearance: Normal appearance.   HENT:      Head: Normocephalic.      Nose: Nose normal.   Eyes:      Extraocular Movements: Extraocular movements intact.   Cardiovascular:      Rate and Rhythm: Normal " rate and regular rhythm.      Pulses: Normal pulses.      Heart sounds: Normal heart sounds.   Pulmonary:      Effort: Pulmonary effort is normal.      Breath sounds: Normal breath sounds.   Abdominal:      Tenderness: There is no abdominal tenderness. There is no right CVA tenderness or left CVA tenderness.   Musculoskeletal:         General: Normal range of motion.      Cervical back: Normal range of motion.   Skin:     General: Skin is warm and dry.   Neurological:      General: No focal deficit present.      Mental Status: She is alert.   Psychiatric:         Mood and Affect: Mood normal.         Behavior: Behavior normal.         Judgment: Judgment normal.     Urinalysis results:    Leukocyte-small; nitrite-negative; 5-trace; glucose, bilirubin, ketone, protein-negative             Assessment & Plan        1. Acute cystitis with hematuria    - Urine Culture; Future  - cephALEXin (KEFLEX) 500 MG Cap; Take 1 Capsule by mouth 2 times a day for 7 days.  Dispense: 14 Capsule; Refill: 0    2. Dysuria    - POCT Urinalysis  - Urine Culture; Future    3. Urinary urgency    - POCT Urinalysis  - Urine Culture; Future       Patient is consistent with acute cystitis.  She is prescribed cephalexin twice daily for 7 days, pending urine culture.  Recommended increasing her oral fluid intake, and not holding urine.  Advised patient to follow-up with OB/GYN soon.  Discussed treatment plan with patient, she is agreeable and verbalized understanding.  Educated patient and  on signs and symptoms watch out for, when to return to the clinic or go to the ER.    Electronically Signed by YEYO Quinones

## 2022-07-05 DIAGNOSIS — R30.0 DYSURIA: ICD-10-CM

## 2022-07-05 DIAGNOSIS — R39.15 URINARY URGENCY: ICD-10-CM

## 2022-07-05 DIAGNOSIS — N30.01 ACUTE CYSTITIS WITH HEMATURIA: ICD-10-CM

## 2022-07-07 LAB
BACTERIA UR CULT: ABNORMAL
SIGNIFICANT IND 70042: ABNORMAL
SITE SITE: ABNORMAL
SOURCE SOURCE: ABNORMAL

## 2022-07-14 ENCOUNTER — ANESTHESIA EVENT (OUTPATIENT)
Dept: ANESTHESIOLOGY | Facility: MEDICAL CENTER | Age: 32
End: 2022-07-14
Payer: COMMERCIAL

## 2022-07-14 ENCOUNTER — ANESTHESIA (OUTPATIENT)
Dept: ANESTHESIOLOGY | Facility: MEDICAL CENTER | Age: 32
End: 2022-07-14
Payer: COMMERCIAL

## 2022-07-14 ENCOUNTER — HOSPITAL ENCOUNTER (INPATIENT)
Facility: MEDICAL CENTER | Age: 32
LOS: 1 days | End: 2022-07-15
Attending: OBSTETRICS & GYNECOLOGY | Admitting: OBSTETRICS & GYNECOLOGY
Payer: COMMERCIAL

## 2022-07-14 LAB
BASOPHILS # BLD AUTO: 0.4 % (ref 0–1.8)
BASOPHILS # BLD: 0.04 K/UL (ref 0–0.12)
EOSINOPHIL # BLD AUTO: 0.05 K/UL (ref 0–0.51)
EOSINOPHIL NFR BLD: 0.5 % (ref 0–6.9)
ERYTHROCYTE [DISTWIDTH] IN BLOOD BY AUTOMATED COUNT: 44.8 FL (ref 35.9–50)
HCT VFR BLD AUTO: 38 % (ref 37–47)
HGB BLD-MCNC: 13.4 G/DL (ref 12–16)
HOLDING TUBE BB 8507: NORMAL
IMM GRANULOCYTES # BLD AUTO: 0.28 K/UL (ref 0–0.11)
IMM GRANULOCYTES NFR BLD AUTO: 3 % (ref 0–0.9)
LYMPHOCYTES # BLD AUTO: 2.12 K/UL (ref 1–4.8)
LYMPHOCYTES NFR BLD: 22.9 % (ref 22–41)
MCH RBC QN AUTO: 31.1 PG (ref 27–33)
MCHC RBC AUTO-ENTMCNC: 35.3 G/DL (ref 33.6–35)
MCV RBC AUTO: 88.2 FL (ref 81.4–97.8)
MONOCYTES # BLD AUTO: 0.89 K/UL (ref 0–0.85)
MONOCYTES NFR BLD AUTO: 9.6 % (ref 0–13.4)
NEUTROPHILS # BLD AUTO: 5.88 K/UL (ref 2–7.15)
NEUTROPHILS NFR BLD: 63.6 % (ref 44–72)
NRBC # BLD AUTO: 0 K/UL
NRBC BLD-RTO: 0 /100 WBC
PLATELET # BLD AUTO: 185 K/UL (ref 164–446)
PMV BLD AUTO: 10.9 FL (ref 9–12.9)
RBC # BLD AUTO: 4.31 M/UL (ref 4.2–5.4)
WBC # BLD AUTO: 9.3 K/UL (ref 4.8–10.8)

## 2022-07-14 PROCEDURE — 304965 HCHG RECOVERY SERVICES

## 2022-07-14 PROCEDURE — 01967 NEURAXL LBR ANES VAG DLVR: CPT | Performed by: ANESTHESIOLOGY

## 2022-07-14 PROCEDURE — 700102 HCHG RX REV CODE 250 W/ 637 OVERRIDE(OP): Performed by: OBSTETRICS & GYNECOLOGY

## 2022-07-14 PROCEDURE — 10907ZC DRAINAGE OF AMNIOTIC FLUID, THERAPEUTIC FROM PRODUCTS OF CONCEPTION, VIA NATURAL OR ARTIFICIAL OPENING: ICD-10-PCS | Performed by: OBSTETRICS & GYNECOLOGY

## 2022-07-14 PROCEDURE — 36415 COLL VENOUS BLD VENIPUNCTURE: CPT

## 2022-07-14 PROCEDURE — 85025 COMPLETE CBC W/AUTO DIFF WBC: CPT

## 2022-07-14 PROCEDURE — 303615 HCHG EPIDURAL/SPINAL ANESTHESIA FOR LABOR

## 2022-07-14 PROCEDURE — A9270 NON-COVERED ITEM OR SERVICE: HCPCS | Performed by: OBSTETRICS & GYNECOLOGY

## 2022-07-14 PROCEDURE — 700111 HCHG RX REV CODE 636 W/ 250 OVERRIDE (IP): Performed by: ANESTHESIOLOGY

## 2022-07-14 PROCEDURE — 0UQMXZZ REPAIR VULVA, EXTERNAL APPROACH: ICD-10-PCS | Performed by: OBSTETRICS & GYNECOLOGY

## 2022-07-14 PROCEDURE — 302449 STATCHG TRIAGE ONLY (STATISTIC)

## 2022-07-14 PROCEDURE — 700111 HCHG RX REV CODE 636 W/ 250 OVERRIDE (IP): Performed by: OBSTETRICS & GYNECOLOGY

## 2022-07-14 PROCEDURE — 700105 HCHG RX REV CODE 258: Performed by: OBSTETRICS & GYNECOLOGY

## 2022-07-14 PROCEDURE — 59409 OBSTETRICAL CARE: CPT

## 2022-07-14 PROCEDURE — 770002 HCHG ROOM/CARE - OB PRIVATE (112)

## 2022-07-14 RX ORDER — IBUPROFEN 800 MG/1
800 TABLET ORAL
Status: COMPLETED | OUTPATIENT
Start: 2022-07-14 | End: 2022-07-14

## 2022-07-14 RX ORDER — ACETAMINOPHEN 500 MG
1000 TABLET ORAL EVERY 6 HOURS PRN
Status: DISCONTINUED | OUTPATIENT
Start: 2022-07-14 | End: 2022-07-15 | Stop reason: HOSPADM

## 2022-07-14 RX ORDER — OXYTOCIN 10 [USP'U]/ML
10 INJECTION, SOLUTION INTRAMUSCULAR; INTRAVENOUS
Status: DISCONTINUED | OUTPATIENT
Start: 2022-07-14 | End: 2022-07-14 | Stop reason: HOSPADM

## 2022-07-14 RX ORDER — ONDANSETRON 4 MG/1
4 TABLET, ORALLY DISINTEGRATING ORAL EVERY 6 HOURS PRN
Status: DISCONTINUED | OUTPATIENT
Start: 2022-07-14 | End: 2022-07-14 | Stop reason: HOSPADM

## 2022-07-14 RX ORDER — TERBUTALINE SULFATE 1 MG/ML
0.25 INJECTION, SOLUTION SUBCUTANEOUS
Status: DISCONTINUED | OUTPATIENT
Start: 2022-07-14 | End: 2022-07-14 | Stop reason: HOSPADM

## 2022-07-14 RX ORDER — SODIUM CHLORIDE, SODIUM LACTATE, POTASSIUM CHLORIDE, AND CALCIUM CHLORIDE .6; .31; .03; .02 G/100ML; G/100ML; G/100ML; G/100ML
250 INJECTION, SOLUTION INTRAVENOUS PRN
Status: DISCONTINUED | OUTPATIENT
Start: 2022-07-14 | End: 2022-07-14 | Stop reason: HOSPADM

## 2022-07-14 RX ORDER — ACETAMINOPHEN 500 MG
1000 TABLET ORAL
Status: DISCONTINUED | OUTPATIENT
Start: 2022-07-14 | End: 2022-07-14 | Stop reason: HOSPADM

## 2022-07-14 RX ORDER — SODIUM CHLORIDE, SODIUM LACTATE, POTASSIUM CHLORIDE, CALCIUM CHLORIDE 600; 310; 30; 20 MG/100ML; MG/100ML; MG/100ML; MG/100ML
INJECTION, SOLUTION INTRAVENOUS PRN
Status: DISCONTINUED | OUTPATIENT
Start: 2022-07-14 | End: 2022-07-15 | Stop reason: HOSPADM

## 2022-07-14 RX ORDER — ROPIVACAINE HYDROCHLORIDE 2 MG/ML
INJECTION, SOLUTION EPIDURAL; INFILTRATION; PERINEURAL CONTINUOUS
Status: DISCONTINUED | OUTPATIENT
Start: 2022-07-14 | End: 2022-07-15 | Stop reason: ALTCHOICE

## 2022-07-14 RX ORDER — IBUPROFEN 800 MG/1
800 TABLET ORAL EVERY 8 HOURS PRN
Status: DISCONTINUED | OUTPATIENT
Start: 2022-07-14 | End: 2022-07-15 | Stop reason: HOSPADM

## 2022-07-14 RX ORDER — DEXTROSE, SODIUM CHLORIDE, SODIUM LACTATE, POTASSIUM CHLORIDE, AND CALCIUM CHLORIDE 5; .6; .31; .03; .02 G/100ML; G/100ML; G/100ML; G/100ML; G/100ML
INJECTION, SOLUTION INTRAVENOUS CONTINUOUS
Status: DISCONTINUED | OUTPATIENT
Start: 2022-07-14 | End: 2022-07-15 | Stop reason: ALTCHOICE

## 2022-07-14 RX ORDER — MISOPROSTOL 200 UG/1
600 TABLET ORAL
Status: DISCONTINUED | OUTPATIENT
Start: 2022-07-14 | End: 2022-07-15 | Stop reason: HOSPADM

## 2022-07-14 RX ORDER — SODIUM CHLORIDE, SODIUM LACTATE, POTASSIUM CHLORIDE, AND CALCIUM CHLORIDE .6; .31; .03; .02 G/100ML; G/100ML; G/100ML; G/100ML
1000 INJECTION, SOLUTION INTRAVENOUS
Status: DISCONTINUED | OUTPATIENT
Start: 2022-07-14 | End: 2022-07-14 | Stop reason: HOSPADM

## 2022-07-14 RX ORDER — DOCUSATE SODIUM 100 MG/1
100 CAPSULE, LIQUID FILLED ORAL 2 TIMES DAILY PRN
Status: DISCONTINUED | OUTPATIENT
Start: 2022-07-14 | End: 2022-07-15 | Stop reason: HOSPADM

## 2022-07-14 RX ORDER — ONDANSETRON 2 MG/ML
4 INJECTION INTRAMUSCULAR; INTRAVENOUS EVERY 6 HOURS PRN
Status: DISCONTINUED | OUTPATIENT
Start: 2022-07-14 | End: 2022-07-14 | Stop reason: HOSPADM

## 2022-07-14 RX ORDER — SODIUM CHLORIDE, SODIUM LACTATE, POTASSIUM CHLORIDE, CALCIUM CHLORIDE 600; 310; 30; 20 MG/100ML; MG/100ML; MG/100ML; MG/100ML
1000 INJECTION, SOLUTION INTRAVENOUS CONTINUOUS
Status: ACTIVE | OUTPATIENT
Start: 2022-07-14 | End: 2022-07-14

## 2022-07-14 RX ADMIN — OXYTOCIN 125 ML/HR: 10 INJECTION, SOLUTION INTRAMUSCULAR; INTRAVENOUS at 15:07

## 2022-07-14 RX ADMIN — SODIUM CHLORIDE, POTASSIUM CHLORIDE, SODIUM LACTATE AND CALCIUM CHLORIDE 1000 ML: 600; 310; 30; 20 INJECTION, SOLUTION INTRAVENOUS at 07:15

## 2022-07-14 RX ADMIN — OXYTOCIN 2000 ML/HR: 10 INJECTION, SOLUTION INTRAMUSCULAR; INTRAVENOUS at 13:58

## 2022-07-14 RX ADMIN — ACETAMINOPHEN 1000 MG: 500 TABLET ORAL at 18:17

## 2022-07-14 RX ADMIN — ROPIVACAINE HYDROCHLORIDE: 2 INJECTION, SOLUTION EPIDURAL; INFILTRATION at 09:25

## 2022-07-14 RX ADMIN — IBUPROFEN 800 MG: 800 TABLET, FILM COATED ORAL at 16:05

## 2022-07-14 RX ADMIN — DOCUSATE SODIUM 100 MG: 100 CAPSULE, LIQUID FILLED ORAL at 22:46

## 2022-07-14 ASSESSMENT — LIFESTYLE VARIABLES
CONSUMPTION TOTAL: INCOMPLETE
TOTAL SCORE: 0
ALCOHOL_USE: NO
EVER_SMOKED: NEVER
EVER HAD A DRINK FIRST THING IN THE MORNING TO STEADY YOUR NERVES TO GET RID OF A HANGOVER: NO
DOES PATIENT WANT TO STOP DRINKING: NO
TOTAL SCORE: 0
HAVE YOU EVER FELT YOU SHOULD CUT DOWN ON YOUR DRINKING: NO
EVER FELT BAD OR GUILTY ABOUT YOUR DRINKING: NO
HAVE PEOPLE ANNOYED YOU BY CRITICIZING YOUR DRINKING: NO
TOTAL SCORE: 0

## 2022-07-14 ASSESSMENT — PAIN DESCRIPTION - PAIN TYPE
TYPE: ACUTE PAIN

## 2022-07-14 ASSESSMENT — PATIENT HEALTH QUESTIONNAIRE - PHQ9
2. FEELING DOWN, DEPRESSED, IRRITABLE, OR HOPELESS: NOT AT ALL
1. LITTLE INTEREST OR PLEASURE IN DOING THINGS: NOT AT ALL
SUM OF ALL RESPONSES TO PHQ9 QUESTIONS 1 AND 2: 0

## 2022-07-14 ASSESSMENT — PAIN SCALES - GENERAL
PAINLEVEL: 8
PAIN_LEVEL: 2
PAIN_LEVEL: 3

## 2022-07-14 NOTE — ANESTHESIA PROCEDURE NOTES
Epidural Block    Date/Time: 7/14/2022 9:07 AM  Performed by: Braulio Adair M.D.  Authorized by: Braulio Adair M.D.     Patient Location:  OB  Start Time:  7/14/2022 9:07 AM  Reason for Block: labor analgesia    patient identified, IV checked, site marked, risks and benefits discussed, surgical consent, monitors and equipment checked, pre-op evaluation and timeout performed    Patient Position:  Sitting  Prep: ChloraPrep, patient draped and sterile technique    Monitoring:  Blood pressure, continuous pulse oximetry and heart rate  Approach:  Midline  Location:  L2-L3  Injection Technique:  JUNE saline  Skin infiltration:  Lidocaine  Strength:  1%  Dose:  3ml  Needle Type:  Tuohy  Needle Gauge:  17 G  Needle Length:  3.5 in  Loss of resistance::  5  Catheter Size:  19 G  Catheter at Skin Depth:  10

## 2022-07-14 NOTE — H&P
DATE OF ADMISSION:  2022     IDENTIFICATION:  This is a 32-year-old  1, para 0 female with an EDC of   2022 which makes her 38 weeks and 3 days.  She is admitted in   spontaneous labor.     HISTORY OF PRESENT ILLNESS:  This is a patient of Dr. Lujan.  Pregnancy   has been uncomplicated.  She had low risk NIPT testing with negative carrier   testing.  Her anatomy scan was within normal limits.  She has a posterior   placenta, no previa.  We are expecting a boy.  Her GBS is negative.  She   presented complaining of regular contractions. On admission, she was found to   be 4 cm, 70% effaced and -2 station.  She is still intact.  She is requesting   an epidural currently.     PAST MEDICAL HISTORY:  COVID in 2021.     PAST SURGICAL HISTORY:  None.     FAMILY HISTORY:  Noncontributory.     SOCIAL HISTORY:  She is here with Usman, her partner.  She denies use of   tobacco, alcohol or drugs.     PHYSICAL EXAMINATION:  VITAL SIGNS:  Her blood pressure is normotensive.  She is afebrile.  GENERAL:  She is uncomfortable with labor.  LUNGS:  Her breathing is unlabored.  ABDOMEN:  Soft, gravid, Leopold's about 6-1/2 pounds.  GENITOURINARY:  Vaginal exam as noted about 4/80/-2.  EXTREMITIES:  No edema.  PELVIC:  Fetal heart tracings currently category 1.  She has contractions   every 2-3 minutes on tocometry.     ASSESSMENT AND PLAN:  1.  A 32-year-old G1, P0 at 38 weeks and 3 days.  2.  Active labor.  3.  GBS negative.     PLAN:Will be to get for admission for labor and anticipated delivery.  We will   get an epidural to get her for pain control.  We will follow her along in   labor.        ______________________________  MD EDDI Sierra/ZURDO    DD:  2022 09:10  DT:  2022 09:39    Job#:  [id number]

## 2022-07-14 NOTE — PROGRESS NOTES
0645: SBAR report received from Alexa LITTLEJOHN. Pt admitted, ready for move to labor room.     0700: Pt set up in labor room. Requesting epidural, bolus starting now.     1100: MD at bedside. Pt 7/100/-1 AROM clear fluid.     1316: RN at bedside d/t prolonged fetal deceleration. Trinity LITTLEJOHN at bedside for assistance. SVE performed. Pt 10/100/+2. Decelerations resolved.     1322: Call to MD regarding SVE and decelerations. MD gives ok to push. MD on floor for another delivery. Call when ready.     1348: MD at bedside. Preparing for delivery.    1354: Viable Baby Boy born with APGARs 8/9. Nuchal x1 reduced without issues. No other complications of delivery.

## 2022-07-14 NOTE — PROGRESS NOTES
0613 Patient is a  at 38weeks and 3days, EDC of . Arrived to unit and placed in LDA 2, RN placed EFM and toco to ensure fetal well being and monitor uterine activity. RN educated patient on need for monitors and patient verbalized understanding. Vital signs taken. Available prenatal labs / records reviewed by RN. Patient's chief complaint contractions since 400.  Patient denies leaking or vaginal bleeding, reports positive fetal movement.   06 SVE /-2  06 notified of patient arrival, report given with an MFTI of 4,  & para reviewed, bps, fhr, contraction pattern reviewed, patient desires to use an epidural pain relief measures. Routine admission orders received, no gbs in pnr.   0700 Report given to Inna LITTLEJOHN, assumed care, POC discussed, all questions answered; vital signs stable.

## 2022-07-15 VITALS
HEIGHT: 62 IN | BODY MASS INDEX: 27.6 KG/M2 | WEIGHT: 150 LBS | DIASTOLIC BLOOD PRESSURE: 50 MMHG | HEART RATE: 80 BPM | RESPIRATION RATE: 18 BRPM | OXYGEN SATURATION: 95 % | SYSTOLIC BLOOD PRESSURE: 94 MMHG | TEMPERATURE: 98 F

## 2022-07-15 LAB
ERYTHROCYTE [DISTWIDTH] IN BLOOD BY AUTOMATED COUNT: 44.9 FL (ref 35.9–50)
HCT VFR BLD AUTO: 35.1 % (ref 37–47)
HGB BLD-MCNC: 12.1 G/DL (ref 12–16)
MCH RBC QN AUTO: 30.6 PG (ref 27–33)
MCHC RBC AUTO-ENTMCNC: 34.5 G/DL (ref 33.6–35)
MCV RBC AUTO: 88.9 FL (ref 81.4–97.8)
PLATELET # BLD AUTO: 169 K/UL (ref 164–446)
PMV BLD AUTO: 10.4 FL (ref 9–12.9)
RBC # BLD AUTO: 3.95 M/UL (ref 4.2–5.4)
WBC # BLD AUTO: 12.2 K/UL (ref 4.8–10.8)

## 2022-07-15 PROCEDURE — 36415 COLL VENOUS BLD VENIPUNCTURE: CPT

## 2022-07-15 PROCEDURE — 700102 HCHG RX REV CODE 250 W/ 637 OVERRIDE(OP): Performed by: OBSTETRICS & GYNECOLOGY

## 2022-07-15 PROCEDURE — 85027 COMPLETE CBC AUTOMATED: CPT

## 2022-07-15 PROCEDURE — A9270 NON-COVERED ITEM OR SERVICE: HCPCS | Performed by: OBSTETRICS & GYNECOLOGY

## 2022-07-15 RX ADMIN — DOCUSATE SODIUM 100 MG: 100 CAPSULE, LIQUID FILLED ORAL at 07:38

## 2022-07-15 RX ADMIN — IBUPROFEN 800 MG: 800 TABLET, FILM COATED ORAL at 07:38

## 2022-07-15 RX ADMIN — ACETAMINOPHEN 1000 MG: 500 TABLET ORAL at 07:38

## 2022-07-15 ASSESSMENT — PAIN DESCRIPTION - PAIN TYPE
TYPE: ACUTE PAIN

## 2022-07-15 ASSESSMENT — EDINBURGH POSTNATAL DEPRESSION SCALE (EPDS)
I HAVE BEEN ABLE TO LAUGH AND SEE THE FUNNY SIDE OF THINGS: AS MUCH AS I ALWAYS COULD
I HAVE LOOKED FORWARD WITH ENJOYMENT TO THINGS: AS MUCH AS I EVER DID
THINGS HAVE BEEN GETTING ON TOP OF ME: NO, I HAVE BEEN COPING AS WELL AS EVER
I HAVE BEEN SO UNHAPPY THAT I HAVE BEEN CRYING: NO, NEVER
I HAVE BEEN ANXIOUS OR WORRIED FOR NO GOOD REASON: NO, NOT AT ALL
THE THOUGHT OF HARMING MYSELF HAS OCCURRED TO ME: NEVER
I HAVE FELT SAD OR MISERABLE: NO, NOT AT ALL
I HAVE BEEN SO UNHAPPY THAT I HAVE HAD DIFFICULTY SLEEPING: NOT AT ALL
I HAVE FELT SCARED OR PANICKY FOR NO GOOD REASON: NO, NOT AT ALL
I HAVE BLAMED MYSELF UNNECESSARILY WHEN THINGS WENT WRONG: NO, NEVER

## 2022-07-15 NOTE — L&D DELIVERY NOTE
DATE OF SERVICE:  2022     This is a 32-year-old  at 38 weeks and 3 days.  She was admitted early   this morning in spontaneous labor with regular contractions at 4 cm.  Her GBS   was noted to be negative.  She otherwise had an uncomplicated pregnancy.  She   received prenatal care with Dr. Sloan.  She was admitted and received an   epidural for pain control.  She underwent artificial rupture of membranes,   progressed nicely to complete, pushed for 20 minutes to go on to deliver a   male infant from OA presentation.  Delivery of the head was manually assisted,   shoulders and rest of body followed without difficulty.  There was a nuchal   cord that reduced easily over the head.  Baby was placed on mom's abdomen with   a vigorous cry and excellent tone.  Mouth was bulb suctioned.  Cord was   doubly clamped and cut at approximately 1 minute.  Placenta delivered   spontaneously and intact.  A 3-vessel cord was noted.  First-degree midline   laceration was repaired in the usual fashion using 3-0 Vicryl.  Uterus firmed   up nicely with IV Pitocin and bimanual massage.   mL.  Baby's Apgars   were 8 and 9, weight is pending.  There were no complications.  Sponge and   needle counts were correct.        ______________________________  MD EDDI Sierra/BARBER/VINCENT    DD:  2022 14:11  DT:  2022 20:26    Job#:  [id number]

## 2022-07-15 NOTE — PROGRESS NOTES
Report received from Batool LITTLEJOHN. Assumed care. Discussed plan of care to patient with the help of FOB. Assessment done. She's ambulating well inside her room. Medicated for pan level of 7. Will check patient at frequent intervals.

## 2022-07-15 NOTE — ANESTHESIA POSTPROCEDURE EVALUATION
Patient: Muriel Villaseñor    Procedure Summary     Date: 07/14/22 Room / Location:     Anesthesia Start: 0907 Anesthesia Stop: 1354    Procedure: Labor Epidural Diagnosis:     Scheduled Providers:  Responsible Provider: Braulio Adair M.D.    Anesthesia Type: epidural ASA Status: 2          Final Anesthesia Type: epidural  Last vitals  BP   Blood Pressure: 104/62    Temp   36.7 °C (98 °F)    Pulse   66   Resp   18    SpO2   98 %      Anesthesia Post Evaluation    Patient location during evaluation: PACU  Patient participation: complete - patient participated  Level of consciousness: awake and alert  Pain score: 2    Airway patency: patent  Anesthetic complications: no  Cardiovascular status: hemodynamically stable  Respiratory status: acceptable  Hydration status: euvolemic    PONV: none          No complications documented.     Nurse Pain Score: 0 (NPRS)

## 2022-07-15 NOTE — PROGRESS NOTES
[place] observed cosleeping in bed with MOB. MOB gently woken and safe sleep education reinforced. [place] returned to open crib.

## 2022-07-15 NOTE — CARE PLAN
The patient is Stable - Low risk of patient condition declining or worsening    Shift Goals  Clinical Goals: VSS    Progress made toward(s) clinical / shift goals:  VSS     Problem: Altered Physiologic Condition  Goal: Patient physiologically stable as evidenced by normal lochia, palpable uterine involution and vitals within normal limits  Outcome: Progressing  NOTE: Patient is physiologically stable as evidenced by light lochia rubra, firm fundus one fingerbreadth below the umbilicus, and vital signs WDL. Will continue to monitor patient condition.       Problem: Infection - Postpartum  Goal: Postpartum patient will be free of signs and symptoms of infection  Outcome: Progressing  NOTE: Patient is afebrile and absent for other signs/symptoms of infection. Vital signs WDL.  Will continue to monitor patient condition.         Patient is not progressing towards the following goals: NA

## 2022-07-15 NOTE — PROGRESS NOTES
Discharge instruction discussed. Patient is comfortable with her feeding plan. She's bottle feeding her baby with Similac. Mom and baby both doing well and they have provided car seat for baby.

## 2022-07-15 NOTE — ANESTHESIA POSTPROCEDURE EVALUATION
Patient: Muriel Villaseñor    Procedure Summary     Date: 07/14/22 Room / Location:     Anesthesia Start: 0907 Anesthesia Stop: 1354    Procedure: Labor Epidural Diagnosis:     Scheduled Providers:  Responsible Provider: Braulio Adair M.D.    Anesthesia Type: epidural ASA Status: 2          Final Anesthesia Type: epidural  Last vitals  BP   Blood Pressure: 104/62    Temp   36.7 °C (98 °F)    Pulse   66   Resp   18    SpO2   98 %      Anesthesia Post Evaluation    Patient location during evaluation: PACU  Patient participation: complete - patient participated  Level of consciousness: awake and alert  Pain score: 3    Airway patency: patent  Anesthetic complications: no  Cardiovascular status: hemodynamically stable  Respiratory status: acceptable  Hydration status: euvolemic    PONV: none          No complications documented.     Nurse Pain Score: 0 (NPRS)

## 2022-07-15 NOTE — LACTATION NOTE
Mother reports that she is breastfeeding her  without difficulty or discomfort. Resources for out-patient support discussed.Mother prefers to supplement her breast fed infant with formula. Education provided. WIC referral was faxed at parent request. These conversations took place via Language line .

## 2022-07-15 NOTE — PROGRESS NOTES
"Bedside report received from Taina LITTLEJOHN @ 9384. Verified baby's wrist band with the patient and FOB. Cuddle is active. Oriented patient to the room, introduced the call light. Encouraged patient to use the \"Do not disturb sign if needed\". Discussed plan of care. Assessment done. Encouraged to call if with need. Will check at intervals.       "

## 2022-07-15 NOTE — PROGRESS NOTES
1930: Bedside report completed with ERON Chen and assumed care of pt. Bed locked and in lowest position with personal belongings and call light within reach. FOB bedside and translating for patient, reports all needs met at this time. 12 hour chart check completed. Orders/MAR reviewed.     2215: Patient assessment completed. Using in-house , discussed pain management plan and patient requests medication be offered as available. Patient denies dizziness and headaches; states she has been unable to void post-catheterization. Pt assisted to bathroom and was able to void adequate volume without difficulty. Education given on perineal care. Reviewed plan of care, all questions answered, and rounding in place.

## 2022-07-15 NOTE — DISCHARGE SUMMARY
Discharge Summary:     Date of Admission: 2022  Date of Discharge: 07/15/22      Admitting diagnosis:    1. Pregnancy @ 38w3d  2. Labor  3. GBS negative      Discharge Diagnosis:   1. Status post vaginal, spontaneous.  2. Repair second degree laceration    No past medical history on file.  OB History    Para Term  AB Living   1 1 1     1   SAB IAB Ectopic Molar Multiple Live Births           0 1      # Outcome Date GA Lbr Antonio/2nd Weight Sex Delivery Anes PTL Lv   1 Term 22 38w3d  2.97 kg (6 lb 8.8 oz) M Vag-Spont EPI N LULY     No past surgical history on file.  Patient has no known allergies.    Hospital Course:   Pt is a 32 y.o. now  who presented for contractions and was found to be in labor. She was admitted and received epidural for pain control.  She went on to have uncomplicated vaginal delivery.     On day of discharge pt was ambulating, voiding spontaneously, tolerating PO, with adequate pain control, and desiring to go home.    Physical Exam:  Temp:  [36.7 °C (98 °F)-36.9 °C (98.5 °F)] 36.7 °C (98 °F)  Pulse:  [57-81] 80  Resp:  [18] 18  BP: ()/(50-76) 94/50  SpO2:  [92 %-100 %] 95 %  Gen: AAO, NAD  Resp: breathing unlabored  Abd: soft, NT, ND, fundus firm below umiblicus     Ext: NT, noedema    Current Facility-Administered Medications   Medication Dose   • oxytocin (PITOCIN) infusion (for post delivery)  125 mL/hr   • lactated ringers infusion     • docusate sodium (COLACE) capsule 100 mg  100 mg   • ibuprofen (MOTRIN) tablet 800 mg  800 mg   • acetaminophen (TYLENOL) tablet 1,000 mg  1,000 mg   • tetanus-dipth-acell pertussis (Tdap) inj 0.5 mL  0.5 mL   • measles, mumps and rubella vaccine (MMR) injection 0.5 mL  0.5 mL   • PRN oxytocin (PITOCIN) (20 Units/1000 mL) PRN for excessive uterine bleeding - See Admin Instr  125-999 mL/hr   • miSOPROStol (CYTOTEC) tablet 600 mcg  600 mcg       Recent Labs     22  0632 07/15/22  0202   WBC 9.3 12.2*   RBC 4.31 3.95*    HEMOGLOBIN 13.4 12.1   HEMATOCRIT 38.0 35.1*   MCV 88.2 88.9   MCH 31.1 30.6   MCHC 35.3* 34.5   RDW 44.8 44.9   PLATELETCT 185 169   MPV 10.9 10.4         Activity/ Discharge Instructions::   Discharge to home  Pelvic Rest x 6 weeks  No heavy lifting x4 weeks  Call or come to ED for: heavy vaginal bleeding, fever >100.4, severe abdominal pain, severe headache, chest pain, shortness of breath,  N/V, abnormal vaginal discharge, or other concerns.       Follow up:   5-6wks for PP visit.     Discharge Meds:      Medication List      You have not been prescribed any medications.            Dinorah Willams MD

## 2022-07-15 NOTE — DISCHARGE PLANNING
Discharge Planning Assessment Post Partum    Reason for Referral: Resources   Address: [address] [place] [address]  Type of Living Situation:Stable living with FOB  Mom Diagnosis: Postpartum  Baby Diagnosis:    Primary Language: [place] Islander. FOB translated     Name of Baby: Luis Kat  Father of the Baby: Sergio Kat  Involved in baby’s care? 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

## 2023-11-18 NOTE — ANESTHESIA PREPROCEDURE EVALUATION
Date: 07/14/22  Procedure: Labor Epidural         Relevant Problems   No relevant active problems       Physical Exam    Airway   Mallampati: II  TM distance: >3 FB  Neck ROM: full       Cardiovascular - normal exam  Rhythm: regular  Rate: normal  (-) murmur     Dental - normal exam           Pulmonary - normal exam  Breath sounds clear to auscultation     Abdominal    Neurological - normal exam                 Anesthesia Plan    ASA 2       Plan - epidural   Neuraxial block will be labor analgesia                  Pertinent diagnostic labs and testing reviewed    Informed Consent:    Anesthetic plan and risks discussed with patient.         Normal